# Patient Record
Sex: FEMALE | Race: WHITE | Employment: UNEMPLOYED | URBAN - METROPOLITAN AREA
[De-identification: names, ages, dates, MRNs, and addresses within clinical notes are randomized per-mention and may not be internally consistent; named-entity substitution may affect disease eponyms.]

---

## 2017-01-18 PROBLEM — R35.0 FREQUENCY OF URINATION: Status: ACTIVE | Noted: 2017-01-18

## 2018-10-23 ENCOUNTER — APPOINTMENT (OUTPATIENT)
Dept: PHYSICAL THERAPY | Age: 63
End: 2018-10-23
Payer: OTHER GOVERNMENT

## 2018-10-31 ENCOUNTER — HOSPITAL ENCOUNTER (OUTPATIENT)
Dept: PHYSICAL THERAPY | Age: 63
Discharge: HOME | End: 2018-10-31
Payer: OTHER GOVERNMENT

## 2018-10-31 PROCEDURE — 97140 MANUAL THERAPY 1/> REGIONS: CPT

## 2018-10-31 PROCEDURE — 97162 PT EVAL MOD COMPLEX 30 MIN: CPT

## 2018-10-31 PROCEDURE — 97110 THERAPEUTIC EXERCISES: CPT

## 2018-10-31 NOTE — PROGRESS NOTES
Kyree Pollock 33  [facility] PHYSICAL THERAPY 
[address], [place], [address] - Phone: [phone]  Fax: [phone] PLAN OF CARE / STATEMENT OF MEDICAL NECESSITY FOR PHYSICAL THERAPY SERVICES Patient Name: Rosa Us : 1955 Medical  
Diagnosis: Right knee pain [M25.561] Treatment Diagnosis: Right Quad Strain Onset Date: 2018 Referral Source: Fili Burdick [facility]): 10/31/2018 Prior Hospitalization: See medical history Provider #: [id number] Prior Level of Function: Independent, Active Comorbidities: History of Falls, OA, Right Hip Replacement Medications: Verified on Patient Summary List  
The Plan of Care and following information is based on the information from the initial evaluation.  
========================================================================================== Assessment / key information:  Pt is a 66year old female who presents to PT today signs and symptoms consistent with right quadriceps strain after falling on 2018. She demonstrates mild decreases in hip strength, limited quadriceps length, and impaired balance. The resulting condition has affected their ability to negotiate stairs and her environment safely. Patient with a Functional Status score of 38 on FOTO (Focused on Therapeutic Outcomes), which corresponds to a functional limitation of 62%. Patient will benefit from skilled PT services to address these issues. ROM / Strength 
[] Unable to assess                  AROM                      PROM                   Strength (1-5)     Left Right Left Right Left Right Hip Flexion (0-120) WNL   WNL     4+ 4+  
  Extension (0-20) WNL   WNL     4 4  
  IR (0-45)         5 5  
  ER (0-45)         5 5  
  Abduction (0-45) WNL   WNL     5 4+ Knee Flexion (0-135) WNL WNL     5 5  
  Extension (0) WNL WNL     5 5 Ankle Plantarflexion (0-50) WNL  WNL      5 5   Dorsiflexion (0-20) WNL  WNL      5 5  
 Palpation: TTP to distal right quadricep tendon, minimal swelling noted. Non tender to medial and lateral joint line 
  
Valgus@ 0 Degrees              [x] Neg    [] Pos              
Valgus@ 30 Degrees            [x] Neg    [] Pos                
Varus@ 0 Degrees               [x] Neg    [] Pos                
Varus@ 30 Degrees             [x] Neg    [] Pos               
Anterior Drawer                     [x] Neg    [] Pos                
Posterior Drawer                    [x] Neg    [] Pos       
 
Squats: Full Depth, increased anterior tibial translation and minimal pain Single Leg Balance Left: 3\" Right: 1\" Pt was provided a HEP today and educated regarding their diagnosis and prognosis. Thank you for this referral. ========================================================================================== Eval Complexity: History: MEDIUM  Complexity : 1-2 comorbidities / personal factors will impact the outcome/ POC Exam:MEDIUM Complexity : 3 Standardized tests and measures addressing body structure, function, activity limitation and / or participation in recreation  Presentation: MEDIUM Complexity : Evolving with changing characteristics  Clinical Decision Making:MEDIUM Complexity : FOTO score of 26-74Overall Complexity:MEDIUM Problem List: pain affecting function, decrease ROM, decrease strength, impaired gait/ balance, decrease ADL/ functional abilitiies, decrease activity tolerance, decrease flexibility/ joint mobility and decrease transfer abilities Treatment Plan may include any combination of the following: Therapeutic exercise, Therapeutic activities, Neuromuscular re-education, Physical agent/modality, Gait/balance training, Manual therapy, Patient education, Functional mobility training and Stair training Patient / Family readiness to learn indicated by: asking questions, trying to perform skills and interest 
 Persons(s) to be included in education: patient (P) Barriers to Learning/Limitations: None Measures taken: FOTO score 38 indicating 62% functional disability Patient Goal (s): \"reduce pain and strengthen\" Patient self reported health status: good Rehabilitation Potential: good ? Short Term Goals: To be accomplished in  2-3  weeks: 1. Pt will be compliant with HEP for symptom management at home. 2. Patient will increase FOTO Functional Status score to 47 to improve rehabilitative outcomes. ? Long Term Goals: To be accomplished in  4-6  weeks: 1. Pt will be independent with HEP at D/C for self management. 2. Pt will report minimal pain (<2/10) with stair negotiation to return to PLOF. 3. Pt will report pain no greater than 2/10 at start of treatment to improve quality of life 4. Patient will increase FOTO Functional Status score to 57 to decrease functional limitations. 5. Pt will demonstrate right singe leg stance of at least 10 seconds to decrease falls risk. Frequency / Duration:   Patient to be seen  2-3  times per week for 4-6  weeks: 
Patient / Caregiver education and instruction: provided education regarding diagnosis and prognosis as well as details regarding treatment plain. exercises G-Codes (GP):NA Therapist Signature: Ed Trejo DPT Date: 10/31/2018 Certification Period: NA Time: 10:02 AM  
=========================================================================================== I certify that the above Physical Therapy Services are being furnished while the patient is under my care. I agree with the treatment plan and certify that this therapy is necessary. Physician Signature:       Date:      Time:  Please sign and return to [employer] or you may fax the signed copy to [phone]. Thank you.

## 2018-10-31 NOTE — PROGRESS NOTES
PHYSICAL THERAPY - DAILY TREATMENT NOTE Patient Name: Elizabeth Juan        Date: 10/31/2018 : 1955   YES Patient  Verified Visit #:   1     Insurance: Payor:  / Plan: [employer] RETIREES AND DEPENDENTS / Product Type: [employer] / In time: 100 Out time: 200 Total Treatment Time: 60 Medicare/[facility] Time Tracking (below) Total Timed Codes (min):  NA 1:1 Treatment Time:  NA  
TREATMENT AREA =  Right Knee SUBJECTIVE Pain Level (on 0 to 10 scale):  6  / 10 Medication Changes/New allergies or changes in medical history, any new surgeries or procedures? NO    If yes, update Summary List  
Subjective Functional Status/Changes:  []  No changes reported CC: 
HPI: Pt states she slipped on liquid on the floor on . She states the knee buckled medially and hit the ground. X-rays revealed sprain that she points to her lateral knee joint. Complains of posterior and lateral knee pain with some immediate swelling. States that the pain is getting better. She is using a knee brace, iceing, and elevating. Reports history of right jiffy hip surgery. Reports slipping recently last Saturday on a set of stairs that caused hip pain. Was on prednisone prior due to bug bites Was referred to PT until able to see Doctor Soumya Rivera  at 3pm. Would like Dr. Soumya Rivera to be included in care (progress notes, plan of cares. Etc.? ) Symptoms: 
Pain rating (0-10): Today: 6 Best: 8 Worst:10 [x] Constant:                   
                      [] Intermittent:  
  
  
Functional Status Prior level of function: 
Present functional limitations: Stairs, Squats, Lifting What position do you sleep in?:  
 
PMH: OA, Right Jiffy Hip (after 1st procedure was unsuccessful), history of falls, Occupation: retired Home Environment: 
Patient Goals: see chart OBJECTIVE 
 Physical Therapy Evaluation - Knee 
  
  
OBJECTIVE Posture: 
Lateral Shift:              [] R    [] L                       [] +  [] - 
Kyphosis:                  [] Increased                 [] Decreased   [x]  WNL Lordosis:                   [] Increased                 [] Decreased   [x] WNL Pelvic symmetry: [] WNL                    [] Other: 
  
Gait:                                    [x] Normal    [x] Abnormal    [] Antalgic    [] NWB    Device:  
                                            Describe:  Narrow Base of support 
  
ROM / Strength 
[] Unable to assess                  AROM                      PROM                   Strength (1-5)     Left Right Left Right Left Right Hip Flexion (0-120) WNL   WNL     4+ 4+  
  Extension (0-20) WNL   WNL     4 4 IR (0-45)     5 5 ER (0-45)     5 5  
  Abduction (0-45) WNL   WNL     5 4+ Knee Flexion (0-135) WNL WNL   5 5  
  Extension (0) WNL WNL   5 5 Ankle Plantarflexion (0-50) WNL  WNL      5 5  
  Dorsiflexion (0-20) WNL  WNL      5 5  
  
  
Flexibility: [] Unable to assess at this time Hamstrings(90/90 Test): (L) Tightness= [x] WNL   [] Min   [] Mod   [] Severe Degrees: (R) Tightness= [x] WNL   [] Min   [] Mod   [] Severe Degrees: 
Quadriceps (Ely's Test): (L) Tightness= [] WNL   [] Min   [x] Mod   [] Severe (R) Tightness= [] WNL   [] Min   [x] Mod   [] Severe Palpation: TTP to distal right quadricep tendon 
  
Valgus@ 0 Degrees              [x] Neg    [] Pos               Ronan             [] Neg    [] Pos 
Valgus@ 30 Degrees            [x] Neg    [] Pos               Patellar Apprehension[] Neg    [] Pos 
Varus@ 0 Degrees               [x] Neg    [] Pos               Apley's Compression            [] Neg    [] Pos 
Varus@ 30 Degrees             [x] Neg    [] Pos               Apley's Distraction                [] Neg    [] Pos Anterior Drawer                     [] Neg    [] Pos               Other:                                    [] Neg    [] Pos Outcome Measures: 
Squats: Full Depth, increased anterior tibial translation and minimal pain Single Leg Balance Left: 3\" Right: 1\" Functional Reach Test: FGA: 
 
Modalities Rationale:    decrease edema, decrease inflammation and decrease pain to improve patient's ability to perform perform ADL's and age appropriate recreational activities. min [] Estim, type/location:   
                                 []  att     []  unatt     []  w/US     []  w/ice    []  w/heat 
 min []  Mechanical Traction: type/lbs   
               []  pro   []  sup   []  int   []  cont    []  before manual    []  after manual  
 min []  Ultrasound, settings/location:    
 min []  Iontophoresis w/ dexamethasone, location:   
                                           []  take home patch-6hour remove at        []  in clinic  
10 min [x]  Ice     []  Heat    location/position: Right LE elevated  
 min []  Vasopneumatic Device, press/temp:   
 min []  Other:   
[x] Skin assessment post-treatment (if applicable):   
[x]  intact    [x]  redness- no adverse reaction    
[]redness  adverse reaction:   
 
8 min Therapeutic Exercise:  [x]  See flow sheet Rationale:      increase ROM and increase strength to improve the patients ability to negotiate various environments in a safe and effective manner. 8 min Manual Therapy: IASTM anterolateral right Quad Rationale:      decrease pain, increase ROM and increase tissue extensibility to improve patient's pain-free mobility 
 min Patient Education:  YES  Reviewed HEP []  Progressed/Changed HEP based on: Other Objective/Functional Measures: 
 
See Above Post Treatment Pain Level (on 0 to 10) scale:   0  / 10 ASSESSMENT Assessment/Changes in Function:  
 
See POC Justification for Eval Code Complexity: Patient History : Medium, OA, History of Falls, Hip Replacement Examination: Mediu See Objective Clinical Presentation: Medium Clinical Decision Making :  Medium - KONW 50 Patient will continue to benefit from skilled PT services to analyze,, cue,, progress,, modify,, demonstrate,, instruct, and address, movement patterns,, therapeutic interventions,, postural abnormalities,, soft tissue restrictions,, ROM,, strength,, functional mobility,, body mechanics/ergonomics, and home and community integration, to attain remaining goals. Progress toward goals / Updated goals: 
See POC PLAN 
 
[]  Upgrade activities as tolerated YES Continue plan of care  
[]  Discharge due to :   
[]  Other:   
 
Therapist: Ed Trejo DPT Date: 10/31/2018 Time: 12:38 PM  
 
Future Appointments Date Time Provider Ariel Gonzalez 10/31/2018  1:00 PM [facility] AT [facility]

## 2018-11-02 ENCOUNTER — APPOINTMENT (OUTPATIENT)
Dept: PHYSICAL THERAPY | Age: 63
End: 2018-11-02
Payer: OTHER GOVERNMENT

## 2018-11-06 ENCOUNTER — HOSPITAL ENCOUNTER (OUTPATIENT)
Dept: PHYSICAL THERAPY | Age: 63
Discharge: HOME | End: 2018-11-06
Payer: OTHER GOVERNMENT

## 2018-11-06 PROCEDURE — 97140 MANUAL THERAPY 1/> REGIONS: CPT

## 2018-11-06 PROCEDURE — 97110 THERAPEUTIC EXERCISES: CPT

## 2018-11-06 NOTE — PROGRESS NOTES
PHYSICAL THERAPY - DAILY TREATMENT NOTE Patient Name: Eunice Ryan        Date: 2018 : 1955   YES Patient  Verified Visit #:   2   of     Insurance: Payor: TOD / Plan: Jalen Palomares 74 / Product Type: Tim Emmanuel / In time: 255 Out time: 332 Total Treatment Time: 37 Medicare/[facility] Time Tracking (below) Total Timed Codes (min):  NA 1:1 Treatment Time:  NA  
TREATMENT AREA =  Right Knee SUBJECTIVE Pain Level (on 0 to 10 scale):  3-4  / 10 Medication Changes/New allergies or changes in medical history, any new surgeries or procedures? NO    If yes, update Summary List  
Subjective Functional Status/Changes:  []  No changes reported Pt reports compliance with HEP OBJECTIVE 23 min Therapeutic Exercise:  [x]  See flow sheet Rationale:      increase ROM and increase strength to improve the patients ability to participate in ADL's  
14 min Manual Therapy: STM and IASTM via cupping to right quad Rationale:      decrease pain, increase ROM and increase tissue extensibility to improve patient's pain-free mobility 
 min Patient Education:  YES Reviewed HEP []  Progressed/Changed HEP based on: Other Objective/Functional Measures: 
See flowsheet for more details Reports no pain with exercise 
TTP to distal right quad Progressed therex per flowsheet Post Treatment Pain Level (on 0 to 10) scale:   2-3  / 10 ASSESSMENT Assessment/Changes in Function:  
 
Patient tolerated treatment session well and is progressing well towards goals. []  See Progress Note/Recertification Patient will continue to benefit from skilled PT services to analyze,, cue,, progress,, modify,, demonstrate,, instruct, and address, movement patterns,, therapeutic interventions,, postural abnormalities,, soft tissue restrictions,, ROM,, strength,, functional mobility,, body mechanics/ergonomics, and home and community integration, to attain remaining goals. Progress toward goals / Updated goals: 
 1st session since initial evaluaton PLAN 
 
[]  Upgrade activities as tolerated YES Continue plan of care  
[]  Discharge due to :   
[]  Other:   
 
Therapist: Demetra Matthews DPT Date: 11/6/2018 Time: 7:41 AM  
 
Future Appointments Date Time Provider Ariel Gonzalez 11/6/2018  3:00 PM [address] [facility]  
11/9/2018  1:30 PM Parker Liu PT [facility]  
11/14/2018  1:30 PM [facility]  
11/16/2018 11:00 AM Parker Liu PT [facility]  
11/19/2018  2:00 PM [facility]  
11/20/2018 10:30 AM Kate Garcia [facility]  
11/30/2018  1:30 PM Jane Schwartz [facility]

## 2018-11-09 ENCOUNTER — HOSPITAL ENCOUNTER (OUTPATIENT)
Dept: PHYSICAL THERAPY | Age: 63
Discharge: HOME | End: 2018-11-09
Payer: OTHER GOVERNMENT

## 2018-11-09 PROCEDURE — 97140 MANUAL THERAPY 1/> REGIONS: CPT

## 2018-11-09 PROCEDURE — 97110 THERAPEUTIC EXERCISES: CPT

## 2018-11-09 NOTE — PROGRESS NOTES
PHYSICAL THERAPY - DAILY TREATMENT NOTE Patient Name: Eyal Alberts        Date: 2018 : 1955   YES Patient  Verified Visit #:   3   of     Insurance: Payor: TOD / Plan: Jalen Palomares 74 / Product Type: [employer] / In time: 1:30 Out time: 2:11 Total Treatment Time: 41 Medicare/[facility] Time Tracking (below) Total Timed Codes (min):  NA 1:1 Treatment Time:  NA  
TREATMENT AREA = Pain in right knee [M25.561] SUBJECTIVE Pain Level (on 0 to 10 scale):  2-3  / 10 Medication Changes/New allergies or changes in medical history, any new surgeries or procedures? NO    If yes, update Summary List  
Subjective Functional Status/Changes:  []  No changes reported \"This knee rehab is similar to getting over a cold. \" OBJECTIVE Therapeutic Procedures: 
Min Procedure Specifics + Rationale  
n/a [x]  Patient Education (performed throughout session) [x] Review HEP [] Progressed/Changed HEP based on:  
[] proper performance and advancement of Therex/TA [] reduction in pain level   
[] increased functional capacity [] change in directional preference 30 [x] Therapeutic Exercise [x]  See Flowsheet Rationale: increase ROM and increase strength to improve the patients ability to participate in ADL's   
11 [x]  Manual Therapy 
   
 [] STM/DTM     [x] IASTM     [] Scar Massage [] X-friction       [] PNF         [] PROM [] TDN (see objective; actual needle insertion time not billed)  
[] Soft tissue mobz  
[] Joint mobz: Gr I [] II []  III [] IV[] V[]: 
FRAM (F1/F2) along distal right ITB. Dynamic medium cupping along area listed above. Rationale: decrease pain, increase ROM, increase tissue extensibility, decrease trigger points and increase postural awareness to attain functional use and participation with ADL's Other Objective/Functional Measures: PT added TKE,s/l clams, bridge, lateral ambulation, HK ambulation and increased reps/sets/resistance as noted on the flow sheet. PT added exercises to address dynamic valgus noted with single leg and squatting exercises. See flow sheet for more details. Progressed therex per flow sheet. Post Treatment Pain Level (on 0 to 10) scale:   0  / 10 ASSESSMENT Assessment/Changes in Function:  
 
Proximal hip deficits noted leading to poor dynamic control of right knee during functional movements. []  See Plan of Care 
[]  See Progress Note/ Recertification 
[]  See Discharge Summary Patient will continue to benefit from skilled PT services to modify and progress therapeutic interventions, address functional mobility deficits, address ROM deficits, address strength deficits, analyze and address soft tissue restrictions, analyze and cue movement patterns, analyze and modify body mechanics/ergonomics, assess and modify postural abnormalities, address imbalance/dizziness and instruct in home and community integration  to attain remaining goals Progress toward goals / Updated goals: · Short Term Goals: To be accomplished in  2-3  weeks: 1. Pt will be compliant with HEP for symptom management at home. Progressing, PT updated HEP. Will f/u NV. 
2. Patient will increase FOTO Functional Status score to 47 to improve rehabilitative outcomes. · Long Term Goals: To be accomplished in  4-6  weeks: 1. Pt will be independent with HEP at D/C for self management. 2. Pt will report minimal pain (<2/10) with stair negotiation to return to PLOF. 3. Pt will report pain no greater than 2/10 at start of treatment to improve quality of life 4. Patient will increase FOTO Functional Status score to 57 to decrease functional limitations. 5. Pt will demonstrate right singe leg stance of at least 10 seconds to decrease falls risk. PLAN [x]  Upgrade activities as tolerated 
[x]  Update interventions per flow sheet YES Continue plan of care  
[]  Discharge due to :   
[]  Other: Therapist: Alexsandra Waldrop DPT Date: 11/9/2018 Time: 12:00 PM  
 
Future Appointments Date Time Provider Ariel Vilai 11/9/2018  1:30 PM Rajani Disla PT [facility]  
11/14/2018  1:30 PM Jean-Claude Laguna [facility]  
11/16/2018 11:00 AM Rajani Disla PT [facility]  
11/19/2018  2:00 PM Katherine [facility]  
11/20/2018 10:30 AM Jose Michelle [facility]  
[facility]

## 2018-11-16 ENCOUNTER — HOSPITAL ENCOUNTER (OUTPATIENT)
Dept: PHYSICAL THERAPY | Age: 63
Discharge: HOME | End: 2018-11-16
Payer: OTHER GOVERNMENT

## 2018-11-16 PROCEDURE — 97140 MANUAL THERAPY 1/> REGIONS: CPT

## 2018-11-16 PROCEDURE — 97110 THERAPEUTIC EXERCISES: CPT

## 2018-11-16 NOTE — PROGRESS NOTES
PHYSICAL THERAPY - DAILY TREATMENT NOTE Patient Name: Charlie Salvador        Date: 2018 : 1955   YES Patient  Verified Visit #:   4     Insurance: Payor: TOD / Plan: Gabi Client / Product Type: Jaiden Peto / In time: 11:00 Out time: 11:50 Total Treatment Time: 50 Medicare/[facility] Time Tracking (below) Total Timed Codes (min):  NA 1:1 Treatment Time:  NA  
TREATMENT AREA = Knee pain, right [M25.561] SUBJECTIVE Pain Level (on 0 to 10 scale):  2  / 10 Medication Changes/New allergies or changes in medical history, any new surgeries or procedures? NO    If yes, update Summary List  
Subjective Functional Status/Changes:  []  No changes reported \"I feel like my knee is getting better. \" OBJECTIVE Therapeutic Procedures: 
Min Procedure Specifics + Rationale  
n/a [x]  Patient Education (performed throughout session) [x] Review HEP [] Progressed/Changed HEP based on:  
[] proper performance and advancement of Therex/TA [] reduction in pain level   
[] increased functional capacity [] change in directional preference 40 [x] Therapeutic Exercise [x]  See Flowsheet Rationale: increase ROM and increase strength to improve the patients ability to participate in ADL's   
10 [x]  Manual Therapy 
   
 [] STM/DTM     [x] IASTM     [] Scar Massage [] X-friction       [] PNF         [] PROM [] TDN (see objective; actual needle insertion time not billed)  
[] Soft tissue mobz  
[] Joint mobz: Gr I [] II []  III [] IV[] V[]: 
FRAM (F1/F2) along distal quad and ITB. Dynamic cupping along area listed above. Rationale: decrease pain, increase ROM, increase tissue extensibility, decrease trigger points and increase postural awareness to attain functional use and participation with ADL's Other Objective/Functional Measures: PT added hip 3-way, step downs, sit<>stand, HT (off step), and increased reps/sets/resistance as noted on the flow sheet. PT updated HEP. See flow sheet for more details. Progressed therex per flow sheet. Post Treatment Pain Level (on 0 to 10) scale:   0  / 10 ASSESSMENT Assessment/Changes in Function: PT progressing well and reports improved control of right kne during pivoting transfers. []  See Plan of Care 
[]  See Progress Note/ Recertification 
[]  See Discharge Summary Patient will continue to benefit from skilled PT services to modify and progress therapeutic interventions, address functional mobility deficits, address ROM deficits, address strength deficits, analyze and address soft tissue restrictions, analyze and cue movement patterns, analyze and modify body mechanics/ergonomics, assess and modify postural abnormalities, address imbalance/dizziness and instruct in home and community integration  to attain remaining goals Progress toward goals / Updated goals: · Short Term Goals: To be accomplished in  2-3  weeks: 1. Pt will be compliant with HEP for symptom management at home. Progressing, PT updated HEP again. Will f/u NV. 
2. Patient will increase FOTO Functional Status score to 47 to improve rehabilitative outcomes. · Long Term Goals: To be accomplished in  4-6  weeks: 1. Pt will be independent with HEP at D/C for self management. 2. Pt will report minimal pain (<2/10) with stair negotiation to return to PLOF. 3. Pt will report pain no greater than 2/10 at start of treatment to improve quality of life 4. Patient will increase FOTO Functional Status score to 57 to decrease functional limitations. 5. Pt will demonstrate right singe leg stance of at least 10 seconds to decrease falls risk PLAN [x]  Upgrade activities as tolerated 
[x]  Update interventions per flow sheet YES Continue plan of care  
[]  Discharge due to :   
[]  Other:   
 
Therapist: Carmen Ortiz DPT Date: 11/16/2018 Time: 8:01 AM  
 
Future Appointments Date Time Provider Ariel Gonzalez 11/16/2018 11:00 AM Brooke Alberts PT [facility]  
11/19/2018  2:00 PM Keith Son [facility]  
11/30/2018  1:30 PM Karson [facility]

## 2018-11-19 ENCOUNTER — HOSPITAL ENCOUNTER (OUTPATIENT)
Dept: PHYSICAL THERAPY | Age: 63
End: 2018-11-19
Payer: OTHER GOVERNMENT

## 2018-11-20 ENCOUNTER — APPOINTMENT (OUTPATIENT)
Dept: PHYSICAL THERAPY | Age: 63
End: 2018-11-20
Payer: OTHER GOVERNMENT

## 2018-11-30 ENCOUNTER — APPOINTMENT (OUTPATIENT)
Dept: PHYSICAL THERAPY | Age: 63
End: 2018-11-30
Payer: OTHER GOVERNMENT

## 2019-01-02 ENCOUNTER — APPOINTMENT (OUTPATIENT)
Dept: PHYSICAL THERAPY | Age: 64
End: 2019-01-02

## 2019-01-03 ENCOUNTER — APPOINTMENT (OUTPATIENT)
Dept: PHYSICAL THERAPY | Age: 64
End: 2019-01-03

## 2019-02-01 ENCOUNTER — HOSPITAL ENCOUNTER (OUTPATIENT)
Dept: PHYSICAL THERAPY | Age: 64
End: 2019-02-01

## 2019-02-08 ENCOUNTER — APPOINTMENT (OUTPATIENT)
Dept: PHYSICAL THERAPY | Age: 64
End: 2019-02-08

## 2019-02-21 ENCOUNTER — APPOINTMENT (OUTPATIENT)
Dept: PHYSICAL THERAPY | Age: 64
End: 2019-02-21

## 2019-02-27 ENCOUNTER — HOSPITAL ENCOUNTER (OUTPATIENT)
Dept: PHYSICAL THERAPY | Age: 64
Discharge: HOME | End: 2019-02-27
Payer: OTHER GOVERNMENT

## 2019-02-27 PROCEDURE — 97162 PT EVAL MOD COMPLEX 30 MIN: CPT

## 2019-02-27 PROCEDURE — 97110 THERAPEUTIC EXERCISES: CPT

## 2019-02-27 NOTE — PROGRESS NOTES
Kyree Pollock [address]  [facility] PHYSICAL THERAPY 
[address], [place], [address] - Phone: [phone]  Fax: [phone] DISCHARGE SUMMARY FOR PHYSICAL THERAPY Patient Name: Mayo Warner : 1955 Treatment/Medical Diagnosis: Right knee pain [M25.561] Right hip pain [M25.551] Onset Date: 10/6/2018 [facility] Prior Hospitalization: See medical hx Provider #: [id number] Prior Level of Function: Independent, Active Comorbidities: History of Falls, OA, Right Hip Replacement Medications: Verified on Patient Summary List  
Visits from [facility]: 4 Missed Visits: 3 SUMMARY OF TREATMENT Patient's POC has consisted of therex, therapeutic activities, manual therapy prn, modalities prn, pt. education, and a comprehensive HEP. Treatment strategies used to address functional mobility deficits, ROM deficits, strength deficits, analyze and address soft tissue restrictions, analyze and cue movement patterns, analyze and modify body mechanics/ergonomics, assess and modify postural abnormalities and instruct in home and community integration. Key Functional Changes/Progress: Ms. German Craft was originally seen in clinic from 10/31/2018-2018 for right knee pain. Pt report medical complications that did not allow her to attend her scheduled appointments. Pt had another script sent to clinic for right hip pain/ITB bursitis and will be evaluated for condition 2019. Pt to be D/C for right knee pain and evaluated for right hip pain, per script. Thanks for the referral! 
Assessments/Recommendations: Discontinue therapy due to lack of attendance or compliance. If you have any questions/comments please contact us directly at [phone]. Thank you for allowing us to assist in the care of your patient. Therapist Signature:  Phil Stewart DPT Date: 2019 Reporting Period: 10/31/2018-11/16/2018 Time: 6:68 AM  
  
Certification Period: NA  
 
 
NOTE TO PHYSICIAN:  PLEASE COMPLETE THE ORDERS BELOW AND FAX TO [facility]: [phone]. If you are unable to process this request in 24 hours please contact our office: [phone]. 
 
___ I have read the above report and request that my patient be discharged from therapy.   
 
Physician Signature:       Date:      Time:

## 2019-02-27 NOTE — PROGRESS NOTES
Kyree Pollock 31  [facility] PHYSICAL THERAPY 
[address], [place], [address] - Phone: [phone]  Fax: [phone] PLAN OF CARE / STATEMENT OF MEDICAL NECESSITY FOR PHYSICAL THERAPY SERVICES Patient Name: Shayan Tim : 1955 Medical  
Diagnosis: Right knee pain [M25.561] Right hip pain [M25.551] Treatment Diagnosis: Right hip pain Onset Date: Oct 2018 [facility] Prior Hospitalization: See medical history Provider #: [id number] Prior Level of Function: Independent Comorbidities: Right TOÑO (), right jiffy hip () Medications: Verified on Patient Summary List  
The Plan of Care and following information is based on the information from the initial evaluation.  
=========================================================================================== Assessment / key information:  Pt is a 66year old woman who presents to PT today with complaints of right lateral hip pain. Pt previously had right TOÑO then jiffy hip in . Pt hip pain began again after fall in 10/2018. Pt was previously seen by PT to address right knee and hip pain for this. Pt presents today with decreased hip AROM and strength. Pt is recommended for skilled therapy to address these limitations to assist her in returning to her prior level of function. OBJECTIVE 
  
Palpation: TTP along lateral right hip and distal ITB insertion Gait: normal  
  
ROM / Strength 
[] Unable to assess                  AROM                      PROM                   Strength (1-5)     Left Right Left Right Left Right Hip Flexion         4+/5 4+/5  
  Extension         4-/5 3+/5  
  Abduction         4-/5 4-/5  
  Int. rotation 25 20 Sig echevarria Sig echevarria  4+/5 4+/5   
  Ext. rotation 27 20 WNL WNL  4+/5  4+/5 Knee Flexion         5/5 5/5  
  Extension         5/5 5/5 Ankle Plantarflexion              
   Dorsiflexion         5/5 5/5 *all other LE AROM within normal limits 
  
Flexibility: [] Unable to assess at this time Hamstrings: (L) Tightness= [x] WNL   [] Min   [] Mod   [] Severe (R) Tightness= [x] WNL   [] Min   [] Mod   [] Severe Quadriceps: (L) Tightness= [x] WNL   [] Min   [] Mod   [] Severe (R) Tightness= [] WNL   [x] Min   [] Mod   [] Severe 
  
Special tests: 
Hip scour: (-) left, (-) SIJ provocation  
=========================================================================================== Eval Complexity: History: HIGH Complexity :3+ comorbidities / personal factors will impact the outcome/ POC Exam:MEDIUM Complexity : 3 Standardized tests and measures addressing body structure, function, activity limitation and / or participation in recreation  Presentation: MEDIUM Complexity : Evolving with changing characteristics  Clinical Decision Making:MEDIUM Complexity : FOTO score of 26-74Overall Complexity:MEDIUM 
 
FOTO score: 46: which indicates 54 % of functional disability. Problem List: pain affecting function, decrease ROM, decrease strength, impaired gait/ balance, decrease ADL/ functional abilitiies, decrease activity tolerance and decrease flexibility/ joint mobility Treatment Plan may include any combination of the following: Therapeutic exercise, Therapeutic activities, Neuromuscular re-education, Physical agent/modality, Gait/balance training, Manual therapy, Aquatic therapy, Patient education, Self Care training, Functional mobility training, Home safety training and Stair training Patient / Family readiness to learn indicated by: asking questions, trying to perform skills and interest 
Persons(s) to be included in education: patient (P) Barriers to Learning/Limitations: None Measures taken: NA  
Patient Goal (s): Reduce pain and strengthen Patient self reported health status: fair Rehabilitation Potential: good ? Short Term Goals: To be accomplished in  2  weeks: 1. Pt will be compliant with HEP for symptom management at home. 2. Pt will increase right hip IR AROM to 30 deg for improved hip mechanics during IC of gait. 3. Pt will report ability to ambulate 40 minute with no increase in symptoms in order to facilitate return to PLOF. ? Long Term Goals: To be accomplished in  4  weeks: 1. Pt will be independent with HEP at D/C for self management. 2. Pt will increase right IR AROM to 45 deg for decreased stress on the hip joint. 3. Pt will increase right hip extension strength to 4/5 for improved stability during ambulation. 4. Pt to increase FOTO score to > 55 to indicate improved functional independence. 5. Pt will report ability to ambulate 60 minutes with no increase in symptoms in order to return to PLOF. Frequency / Duration:   Patient to be seen  2  times per week for 4-6  weeks: 
Patient / Caregiver education and instruction: self care, activity modification and exercises Therapist Signature: Emmanuel Marsh PT Date: 2/27/2019 Certification Period: NA Time: 8:27 AM  
=========================================================================================== I certify that the above Physical Therapy Services are being furnished while the patient is under my care. I agree with the treatment plan and certify that this therapy is necessary. Physician Signature:       Date:      Time:  Please sign and return to [employer] or you may fax the signed copy to [phone]. Thank you.

## 2019-03-07 ENCOUNTER — APPOINTMENT (OUTPATIENT)
Dept: PHYSICAL THERAPY | Age: 64
End: 2019-03-07
Payer: OTHER GOVERNMENT

## 2019-03-12 ENCOUNTER — IMPORTED ENCOUNTER (OUTPATIENT)
Dept: URBAN - METROPOLITAN AREA CLINIC 1 | Facility: CLINIC | Age: 64
End: 2019-03-12

## 2019-03-12 PROBLEM — H04.123: Noted: 2019-03-12

## 2019-03-12 PROBLEM — H25.813: Noted: 2019-03-12

## 2019-03-12 PROBLEM — H00.12: Noted: 2019-03-12

## 2019-03-12 PROBLEM — H00.11: Noted: 2019-03-12

## 2019-03-12 PROCEDURE — 92002 INTRM OPH EXAM NEW PATIENT: CPT

## 2019-03-12 NOTE — PATIENT DISCUSSION
Chalazion right upper eyelid - Hot compresses TID x 5 minutes for 3 weeks. If without improvement discussed with patient possible Incision and Drainage procedure. Risks and benefits discussed with patient and patient states full understanding.

## 2019-03-12 NOTE — PATIENT DISCUSSION
1. RLL Chalazion -- Hot compresses TID x 5 minutes for 3 weeks. ([employer] handout given) If without improvement discussed with patient possible Incision and Drainage procedure. Risks and benefits discussed with patient and patient states full understanding. 2. KANCHAN OU -- Recommend patient to continue QID OU.3. Cataracts OU -- Observe. Return in May for further evaluation. Return for an appointment in May for a 27 with Dr. Candace Campuzano.

## 2019-03-13 ENCOUNTER — HOSPITAL ENCOUNTER (OUTPATIENT)
Dept: PHYSICAL THERAPY | Age: 64
End: 2019-03-13
Payer: OTHER GOVERNMENT

## 2019-03-21 ENCOUNTER — HOSPITAL ENCOUNTER (OUTPATIENT)
Dept: PHYSICAL THERAPY | Age: 64
Discharge: HOME | End: 2019-03-21
Payer: OTHER GOVERNMENT

## 2019-03-21 PROCEDURE — 97110 THERAPEUTIC EXERCISES: CPT

## 2019-03-21 NOTE — PROGRESS NOTES
PHYSICAL THERAPY - DAILY TREATMENT NOTE Patient Name: Shayan Tim        Date: 3/21/2019 : 1955   YES Patient  Verified Visit #:   2     Insurance: Payor: TOD / Plan: Jalen Palomares 74 / Product Type: Lillian Aj / In time: 11:27 Out time: 11:51 Total Treatment Time: 24 Medicare/[facility] Time Tracking (below) Total Timed Codes (min):  NA 1:1 Treatment Time:  NA  
TREATMENT AREA = Right knee pain [M25.561] Right hip pain [M25.551] SUBJECTIVE Pain Level (on 0 to 10 scale):  5-6  / 10 Medication Changes/New allergies or changes in medical history, any new surgeries or procedures? NO    If yes, update Summary List  
Subjective Functional Status/Changes:  []  No changes reported \"The clams really made me sore. I could barely get out of bed\" OBJECTIVE Therapeutic Procedures: 
Min Procedure Specifics + Rationale  
n/a [x]  Patient Education (performed throughout session) [x] Review HEP [] Progressed/Changed HEP based on:  
[] proper performance and advancement of Therex/TA [] reduction in pain level   
[] increased functional capacity [] change in directional preference 24 [x] Therapeutic Exercise [x]  See Flowsheet Rationale: increase ROM and increase strength to improve the patients ability to participate in ADL's Other Objective/Functional Measures: 
 
Initiated program based on deficits noted during initial evaluation. Mod vcs for proper exercise form. See flow sheet for more details. Progressed therex per flow sheet. Post Treatment Pain Level (on 0 to 10) scale:   5  / 10 ASSESSMENT Assessment/Changes in Function:  
 
Pt tolerated first session post initial evaluation well with mild fatigue noted towards end range of sets. []  See Plan of Care 
[]  See Progress Note/ Recertification 
[]  See Discharge Summary Patient will continue to benefit from skilled PT services to modify and progress therapeutic interventions, address functional mobility deficits, address ROM deficits, address strength deficits, analyze and address soft tissue restrictions, analyze and cue movement patterns, analyze and modify body mechanics/ergonomics, assess and modify postural abnormalities, address imbalance/dizziness and instruct in home and community integration  to attain remaining goals Progress toward goals / Updated goals: 
Initiated POC PLAN [x]  Upgrade activities as tolerated 
[x]  Update interventions per flow sheet YES Continue plan of care  
[]  Discharge due to :   
[]  Other:   
 
Therapist: Twyla Newsome DPT Date: 3/21/2019 Time: 7:45 AM  
 
Future Appointments Date Time Provider Ariel Gonzalez 3/21/2019 11:30 AM Silver Penaloza [facility] AT [facility]

## 2019-03-26 ENCOUNTER — HOSPITAL ENCOUNTER (OUTPATIENT)
Dept: PHYSICAL THERAPY | Age: 64
Discharge: HOME | End: 2019-03-26
Payer: OTHER GOVERNMENT

## 2019-03-26 PROCEDURE — 97110 THERAPEUTIC EXERCISES: CPT

## 2019-03-26 NOTE — PROGRESS NOTES
PHYSICAL THERAPY - DAILY TREATMENT NOTE Patient Name: Ariella Garcia        Date: 3/26/2019 : 1955   YES Patient  Verified Visit #:   3     Insurance: Payor: TOD / Plan: Miguel Galo / Product Type: Lavada Gave / In time: 3:30 Out time: 04:10 Total Treatment Time: 40 Medicare/[facility] Time Tracking (below) Total Timed Codes (min):  NA 1:1 Treatment Time:  NA  
TREATMENT AREA = Right knee pain [M25.561] Right hip pain [M25.551] SUBJECTIVE Pain Level (on 0 to 10 scale):  4-5  / 10 Medication Changes/New allergies or changes in medical history, any new surgeries or procedures? NO    If yes, update Summary List  
Subjective Functional Status/Changes:  []  No changes reported \"I felt pretty good after the last session, I was not in too much pain\" OBJECTIVE Therapeutic Procedures: 
Min Procedure Specifics + Rationale  
n/a [x]  Patient Education (performed throughout session) [x] Review HEP [] Progressed/Changed HEP based on:  
[] proper performance and advancement of Therex/TA [] reduction in pain level   
[] increased functional capacity [] change in directional preference 40 [x] Therapeutic Exercise [x]  See Flowsheet Rationale: increase ROM and increase strength to improve the patients ability to participate in ADL's Other Objective/Functional Measures: PT added QL stretch, mod elizabeth stretch, hip 3-way and increased reps/sets/resistance as noted on the flow sheet. Mod vcs for proper exercise See flow sheet for more details. Progressed therex per flow sheet. Post Treatment Pain Level (on 0 to 10) scale:    10 ASSESSMENT Assessment/Changes in Function:  
 
Pt report an improvement in her pain levels and mild improvement in activity tolerance. []  See Plan of Care 
[]  See Progress Note/ Recertification 
[]  See Discharge Summary Patient will continue to benefit from skilled PT services to modify and progress therapeutic interventions, address functional mobility deficits, address ROM deficits, address strength deficits, analyze and address soft tissue restrictions, analyze and cue movement patterns, analyze and modify body mechanics/ergonomics, assess and modify postural abnormalities, address imbalance/dizziness and instruct in home and community integration  to attain remaining goals Progress toward goals / Updated goals: · Short Term Goals: To be accomplished in  2  weeks: 1. Pt will be compliant with HEP for symptom management at home. Progressing, pt reports compliance. 2. Pt will increase right hip IR AROM to 30 deg for improved hip mechanics during IC of gait. 3. Pt will report ability to ambulate 40 minute with no increase in symptoms in order to facilitate return to PLOF. · Long Term Goals: To be accomplished in  4  weeks: 1. Pt will be independent with HEP at D/C for self management. 2. Pt will increase right IR AROM to 45 deg for decreased stress on the hip joint. 3. Pt will increase right hip extension strength to 4/5 for improved stability during ambulation. 4. Pt to increase FOTO score to > 55 to indicate improved functional independence. 5. Pt will report ability to ambulate 60 minutes with no increase in symptoms in order to return to PLOF. PLAN [x]  Upgrade activities as tolerated 
[x]  Update interventions per flow sheet YES Continue plan of care  
[]  Discharge due to :   
[]  Other:   
 
Therapist: Ann Pollard DPT Date: 3/26/2019 Time: 12:45 PM  
 
Future Appointments Date Time Provider Ariel Gonzalez 3/26/2019  3:30 PM Segundo Carreno PT [facility]  
4/3/2019 11:00 AM Segundo Carreno PT [facility]  
4/5/2019 11:00 AM Rayne [facility]  
4/10/2019 11:00 AM [facility]  
4/12/2019 11:00 AM Segundo Carreno PT [facility]

## 2019-04-03 ENCOUNTER — APPOINTMENT (OUTPATIENT)
Dept: PHYSICAL THERAPY | Age: 64
End: 2019-04-03
Payer: OTHER GOVERNMENT

## 2019-04-05 ENCOUNTER — APPOINTMENT (OUTPATIENT)
Dept: PHYSICAL THERAPY | Age: 64
End: 2019-04-05
Payer: OTHER GOVERNMENT

## 2019-04-10 ENCOUNTER — HOSPITAL ENCOUNTER (OUTPATIENT)
Dept: PHYSICAL THERAPY | Age: 64
End: 2019-04-10
Payer: OTHER GOVERNMENT

## 2019-04-12 ENCOUNTER — HOSPITAL ENCOUNTER (OUTPATIENT)
Dept: PHYSICAL THERAPY | Age: 64
End: 2019-04-12
Payer: OTHER GOVERNMENT

## 2019-04-26 ENCOUNTER — APPOINTMENT (OUTPATIENT)
Dept: PHYSICAL THERAPY | Age: 64
End: 2019-04-26
Payer: OTHER GOVERNMENT

## 2019-04-29 ENCOUNTER — HOSPITAL ENCOUNTER (OUTPATIENT)
Dept: PHYSICAL THERAPY | Age: 64
Discharge: HOME | End: 2019-04-29
Payer: OTHER GOVERNMENT

## 2019-04-29 PROCEDURE — 97110 THERAPEUTIC EXERCISES: CPT

## 2019-04-29 NOTE — PROGRESS NOTES
PHYSICAL THERAPY - DAILY TREATMENT NOTE Patient Name: Lidya Barney        Date: 2019 : 1955   YES Patient  Verified Visit #:   4     Insurance: Payor: TOD / Plan: Paz Larose / Product Type: Marrian Armor / In time: 1:28 Out time: 1;51 Total Treatment Time: 21 Medicare/[employer] Time Tracking (below) Total Timed Codes (min):  NA 1:1 Treatment Time:  NA  
TREATMENT AREA = Right knee pain [M25.561] Right hip pain [M25.551] SUBJECTIVE Pain Level (on 0 to 10 scale):  5  / 10 Medication Changes/New allergies or changes in medical history, any new surgeries or procedures? NO    If yes, update Summary List  
Subjective Functional Status/Changes:  []  No changes reported \"I can walk 30 minutes before I need to stop\" OBJECTIVE Therapeutic Procedures: 
Min Procedure Specifics + Rationale  
n/a [x]  Patient Education (performed throughout session) [x] Review HEP [] Progressed/Changed HEP based on:  
[] proper performance and advancement of Therex/TA [] reduction in pain level   
[] increased functional capacity [] change in directional preference 23 [x] Therapeutic Exercise [x]  See Flowsheet Rationale: increase ROM and increase strength to improve the patients ability to participate in ADL's Other Objective/Functional Measures: LE Strength Left Right Hip flexion 4+/5 4/5 Hip extension 4/5 4/5 Hip abduction 4/5 4/5 Hip IR 4/5 4/5 Hip ER 4/5 4/5 Knee extension 4+/5 4-/5 Knee flexion 4+/5 4+/5 Ankle PF 4+/5 4+/5 Ankle DF 4+/5 4+/5 Right hip IR AROM: 25 deg See flow sheet for more details. Progressed therex per flow sheet. Post Treatment Pain Level (on 0 to 10) scale:   5  / 10 ASSESSMENT Assessment/Changes in Function:  
 
See D/C note  
[]  See Plan of Care 
[]  See Progress Note/ Recertification 
[]  See Discharge Summary Progress toward goals / Updated goals: 
See D/C note PLAN 
 
 []  Upgrade activities as tolerated 
[]  Update interventions per flow sheet NO Continue plan of care [x]  Discharge due to : Minimal progress towards set goals  
[]  Other:   
 
Therapist: Kajal Yang DPT Date: 4/29/2019 Time: 9:01 AM  
 
Future Appointments Date Time Provider Ariel Gonzalez 4/29/2019  1:30 PM Albina Babinski, PT [facility]

## 2019-04-29 NOTE — PROGRESS NOTES
Kyree Pollock [address]  [facility] PHYSICAL THERAPY 
[address], [place], [address] - Phone: [phone]  Fax: [phone] DISCHARGE SUMMARY FOR PHYSICAL THERAPY Patient Name: Jeanie Hoyos : 1955 Treatment/Medical Diagnosis: Right knee pain [M25.561] Right hip pain [M25.551] Onset Date: Oct 2018 Referral Source: Wilman Roy [facility]): 2019 Prior Hospitalization: See medical hx Provider #: [id number] Prior Level of Function: Independent Comorbidities: Right TOÑO (), right jiffy hip () Medications: Verified on Patient Summary List  
Visits from [facility]: 4 Missed Visits: 3 Goal/Measure of Progress Goal Met? 1. Pt will be independent with HEP at D/C for self management. Status at last Eval: Compliant Current Status: Verb and dem ind. yes 2. Pt will increase right hip IR AROM to 30 deg for improved hip mechanics during IC of gait. Status at last Eval: 20 Current Status: 25  no 3. Pt will report ability to ambulate 40 minute with no increase in symptoms in order to facilitate return to PLOF Status at last Eval: 10-15 min Current Status: 30 min Progressing 4. Pt to increase FOTO score to > 55 to indicate improved functional independence. Status at last Eval: 46 Current Status: 45 no  
 
SUMMARY OF TREATMENT Patient's POC has consisted of therex, therapeutic activities, manual therapy prn, modalities prn, pt. education, and a comprehensive HEP. Treatment strategies used to address functional mobility deficits, ROM deficits, strength deficits, analyze and address soft tissue restrictions, analyze and cue movement patterns, analyze and modify body mechanics/ergonomics, assess and modify postural abnormalities and instruct in home and community integration. Key Functional Changes/Progress: Ms. Mitchell Massey has been receiving PT at clinic for chronic right hip and knee pain.  Pt had lapse in care from 02/27/19-03/21/2019 d/t sickness. Pt had an additional lapse in care from 03/26/19-04/29/2019 d/t stye in her eye. PT unable to progress PT program d/t time between sessions. Pt showed up to todays session and stated she is very busy and would be unable to attend PT sessions consistently. Pt to be D/C with HEP. Pt demonstrated and verbalized independence with HEP. LE Strength 
  Left Right Hip flexion 4+/5 4/5 Hip extension 4/5 4/5 Hip abduction 4/5 4/5 Hip IR 4/5 4/5 Hip ER 4/5 4/5 Knee extension 4+/5 4-/5 Knee flexion 4+/5 4+/5 Ankle PF 4+/5 4+/5 Ankle DF 4+/5 4+/5  
  
Assessments/Recommendations: Discontinue therapy due to lack of appreciable progress towards goals. If you have any questions/comments please contact us directly at [phone]. Thank you for allowing us to assist in the care of your patient. Therapist Signature:  Caitlyn Garduno DPT Date: 4/29/2019 Reporting Period: 02/27/2019-04/29/2019 Time: 70:79 AM  
  
Certification Period: NA  
 
 
NOTE TO PHYSICIAN:  PLEASE COMPLETE THE ORDERS BELOW AND FAX TO [facility] Physical Therapy: [phone]. If you are unable to process this request in 24 hours please contact our office: [phone]. 
 
___ I have read the above report and request that my patient be discharged from therapy.   
 
Physician Signature:       Date:      Time:

## 2022-04-08 ASSESSMENT — VISUAL ACUITY
OS_SC: 20/20
OD_SC: 20/30

## 2022-04-08 ASSESSMENT — TONOMETRY
OS_IOP_MMHG: 13
OD_IOP_MMHG: 13